# Patient Record
Sex: FEMALE | Race: WHITE | NOT HISPANIC OR LATINO | Employment: UNEMPLOYED | URBAN - METROPOLITAN AREA
[De-identification: names, ages, dates, MRNs, and addresses within clinical notes are randomized per-mention and may not be internally consistent; named-entity substitution may affect disease eponyms.]

---

## 2019-06-13 ENCOUNTER — TELEPHONE (OUTPATIENT)
Dept: OBSTETRICS AND GYNECOLOGY | Facility: CLINIC | Age: 29
End: 2019-06-13

## 2019-08-21 ENCOUNTER — TELEPHONE (OUTPATIENT)
Dept: OBSTETRICS AND GYNECOLOGY | Facility: CLINIC | Age: 29
End: 2019-08-21

## 2019-08-21 NOTE — TELEPHONE ENCOUNTER
Patient was a no-show for Dr. Wu in June 2019.  I feel that if she is already missed an appointment and failed to re-establish care for 2 months she would be better to seek care elsewhere

## 2019-08-21 NOTE — TELEPHONE ENCOUNTER
Patient called back to check on status of her being able to be seen in our office she would just like a call back today if possible.

## 2019-08-21 NOTE — TELEPHONE ENCOUNTER
Pt called and is asking to schedule new OB. She said she thinks she is around 23 wks pregnant. She has no prenatal care just an ED visit to [facility]. (I will call to get records). Can pt be scheduled?      Thank you